# Patient Record
Sex: FEMALE | URBAN - METROPOLITAN AREA
[De-identification: names, ages, dates, MRNs, and addresses within clinical notes are randomized per-mention and may not be internally consistent; named-entity substitution may affect disease eponyms.]

---

## 2020-06-22 ENCOUNTER — OFFICE VISIT (OUTPATIENT)
Dept: PRIMARY CARE CLINIC | Age: 55
End: 2020-06-22

## 2020-06-22 PROCEDURE — 99211 OFF/OP EST MAY X REQ PHY/QHP: CPT | Performed by: NURSE PRACTITIONER

## 2020-06-22 NOTE — PROGRESS NOTES
FLU/COVID-19 CLINIC EVALUATION  HPI: Patient is in office today with exposure to COVID-19 Virus. Patient has come through the [facility] to be tested for COVID-19. They have concern about possible exposure. They are requesting COVID-19 testing. Symptom duration, days:  [] 1   [] 2    []3   [] 4    []5    []6   [] 7    []8    []9   [] 10    []11 []  12   [] 13    []14 or greater    There were no vitals filed for this visit. ROS:All other systems were reviewed and negative unless stated otherwise below. []Fevers   []Headaches  []Sore throat  []Chest pain/heaviness  []Muscle aches  []Feeling short of breath  []Nausea   []Nasal Congestion  []Vomiting  []Chest Congestion    []Diarrhea  []Cough  []Loss of taste/smell  []Chills    OTHER SYMPTOMS:      RISK FACTORS:  []Pregnancy   []Diabetes  []Heart disease  []Asthma  []COPD/Other chronic lung diseases  []Active Cancer/Chemotherapy   []Taking oral steroids  []Close contact with a lab confirmed COVID-19 patient within 14 days of symptom onset  []Health Care Worker Exposure no symptoms  []Health Care Worker Exposure symptomatic    Assessment  Physical Exam    Constitutional:       Appearance: Normal appearance. HENT:      Head: Normocephalic and atraumatic. Mouth/Throat:      Mouth: Mucous membranes are moist.   Neck:      Musculoskeletal: Normal range of motion. Cardiovascular:     Skin pink and warm     Pulmonary:      Effort: Pulmonary effort is normal.   Musculoskeletal: Normal range of motion. Skin:     General: Skin is warm and dry. Neurological:      General: No focal deficit present. Mental Status: Alert. Mental status is at baseline. Test ordered:    [x]COVID    _________  []Strep    ___________  []Flu       _________      Diagnosis and Plan:      Diagnosis Orders   1. Suspected COVID-19 virus infection  Covid-19 Ambulatory     COVID-19 swab complete at clinic and sent to lab for testing.  Quarantine order in place, patient

## 2020-06-25 LAB
SARS-COV-2: NOT DETECTED
SOURCE: NORMAL